# Patient Record
Sex: FEMALE | Race: WHITE | URBAN - METROPOLITAN AREA
[De-identification: names, ages, dates, MRNs, and addresses within clinical notes are randomized per-mention and may not be internally consistent; named-entity substitution may affect disease eponyms.]

---

## 2021-02-10 ENCOUNTER — NEW PATIENT (OUTPATIENT)
Dept: URBAN - METROPOLITAN AREA CLINIC 10 | Facility: CLINIC | Age: 36
End: 2021-02-10
Payer: COMMERCIAL

## 2021-02-10 DIAGNOSIS — H18.623 KERATOCONUS, UNSTABLE, BILATERAL: Primary | ICD-10-CM

## 2021-02-10 PROCEDURE — 99204 OFFICE O/P NEW MOD 45 MIN: CPT | Performed by: OPHTHALMOLOGY

## 2021-02-10 PROCEDURE — 92025 CPTRIZED CORNEAL TOPOGRAPHY: CPT | Performed by: OPHTHALMOLOGY

## 2021-02-10 PROCEDURE — 76514 ECHO EXAM OF EYE THICKNESS: CPT | Performed by: OPHTHALMOLOGY

## 2021-02-10 RX ORDER — OFLOXACIN 3 MG/ML
0.3 % SOLUTION/ DROPS OPHTHALMIC
Qty: 5 | Refills: 2 | Status: INACTIVE
Start: 2021-02-10 | End: 2021-04-13

## 2021-02-10 RX ORDER — PREDNISOLONE ACETATE 10 MG/ML
1 % SUSPENSION/ DROPS OPHTHALMIC
Qty: 5 | Refills: 2 | Status: INACTIVE
Start: 2021-02-10 | End: 2021-04-13

## 2021-02-10 ASSESSMENT — KERATOMETRY: OD: 47.13

## 2021-02-10 ASSESSMENT — VISUAL ACUITY
OD: 20/80
OS: 20/500

## 2021-02-10 ASSESSMENT — INTRAOCULAR PRESSURE
OS: 14
OD: 9

## 2021-05-05 ENCOUNTER — PROCEDURE (OUTPATIENT)
Dept: URBAN - METROPOLITAN AREA CLINIC 10 | Facility: CLINIC | Age: 36
End: 2021-05-05
Payer: COMMERCIAL

## 2021-05-05 PROCEDURE — 0402T COLGN CRS-LINK CRN&PACHYMTRY: CPT | Performed by: OPHTHALMOLOGY

## 2021-05-06 ENCOUNTER — OFFICE VISIT (OUTPATIENT)
Dept: URBAN - METROPOLITAN AREA CLINIC 10 | Facility: CLINIC | Age: 36
End: 2021-05-06
Payer: COMMERCIAL

## 2021-05-06 PROCEDURE — 92071 CONTACT LENS FITTING FOR TX: CPT | Performed by: OPTOMETRIST

## 2021-05-06 PROCEDURE — 99203 OFFICE O/P NEW LOW 30 MIN: CPT | Performed by: OPTOMETRIST

## 2021-05-06 NOTE — IMPRESSION/PLAN
Impression: Keratoconus, unstable, bilateral
- Pt states progression/fluctuation in refraction noted OU by referring optometrist; pt states that scleral CTLs have been changing every year OD, unable to be fitted into CTL OS. Plan: s/p CXL OD yesterday c Dr. Marge Navarrete. Pt. having pain and minimal BSCL movement. Replaced BSCL with 8.8 VIKRAM. Improved movement so hope for increased comfort. Cont. oflox and pred qid OD. RTC as scheduled c Dr. Marge Navarrete next week.

## 2021-05-13 ENCOUNTER — OFFICE VISIT (OUTPATIENT)
Dept: URBAN - METROPOLITAN AREA CLINIC 10 | Facility: CLINIC | Age: 36
End: 2021-05-13
Payer: COMMERCIAL

## 2021-05-13 PROCEDURE — 99213 OFFICE O/P EST LOW 20 MIN: CPT | Performed by: OPTOMETRIST

## 2021-05-13 NOTE — IMPRESSION/PLAN
Impression: Keratoconus, unstable, bilateral
- Pt states progression/fluctuation in refraction noted OU by referring optometrist; pt states that scleral CTLs have been changing every year OD, unable to be fitted into CTL OS. Plan: s/p CXL OD 5/5/2021 c Dr. Erin Ya. BSCL fell out 3 days prior. No issues. Stop oflox. Cont. pred tid and taper weekly. RTC as scheduled c Dr. Erin Ya next month.

## 2021-06-07 ENCOUNTER — OFFICE VISIT (OUTPATIENT)
Dept: URBAN - METROPOLITAN AREA CLINIC 10 | Facility: CLINIC | Age: 36
End: 2021-06-07
Payer: COMMERCIAL

## 2021-06-07 DIAGNOSIS — H18.622 KERATOCONUS, UNSTABLE, LEFT EYE: ICD-10-CM

## 2021-06-07 PROCEDURE — 99213 OFFICE O/P EST LOW 20 MIN: CPT | Performed by: OPHTHALMOLOGY

## 2021-06-07 ASSESSMENT — INTRAOCULAR PRESSURE: OD: 13

## 2021-06-07 NOTE — IMPRESSION/PLAN
Impression: Keratoconus, unstable, left eye: D90.257. Plan: Vision continues to worsen, severe scarring and thinning, not a candidate for CXL. I would recommend PKP surgery. Visual rehabilitation can take a year or longer. Risks of Keratoplasty include similar risks to any intraocular surgery such as bleeding, cataract, and infection. Risks include rejection, need for additional surgery, need for glasses after, and the risks from the medications used. Steroids should not be stopped without instruction by a doctor. Information packet given. Questions answered.   Scheduled PKP OS. will bring pt in for preop before surgery

## 2021-06-07 NOTE — IMPRESSION/PLAN
Impression: Keratoconus, stable, right eye: H18.611.
-s/p CXL OD 5/5/21 Plan: POM#1 OD, residual haze, restart pred BID x 2 weeks then d/c. Cont lubrication. Precautions reviewed. Will recheck pt at preop OS with refract, IOP check, pentacam, pachy OD same day.

## 2021-08-16 ENCOUNTER — ADULT PHYSICAL (OUTPATIENT)
Dept: URBAN - METROPOLITAN AREA CLINIC 10 | Facility: CLINIC | Age: 36
End: 2021-08-16
Payer: COMMERCIAL

## 2021-08-16 DIAGNOSIS — Z01.818 ENCOUNTER FOR OTHER PREPROCEDURAL EXAMINATION: Primary | ICD-10-CM

## 2021-08-16 PROCEDURE — 99202 OFFICE O/P NEW SF 15 MIN: CPT | Performed by: PHYSICIAN ASSISTANT

## 2021-08-16 RX ORDER — PREDNISOLONE ACETATE 10 MG/ML
1 % SUSPENSION/ DROPS OPHTHALMIC
Qty: 5 | Refills: 2 | Status: ACTIVE
Start: 2021-08-16

## 2021-08-16 RX ORDER — OFLOXACIN 3 MG/ML
0.3 % SOLUTION/ DROPS OPHTHALMIC
Qty: 5 | Refills: 2 | Status: INACTIVE
Start: 2021-08-16 | End: 2021-11-22

## 2021-08-23 ENCOUNTER — PRE-OPERATIVE VISIT (OUTPATIENT)
Dept: URBAN - METROPOLITAN AREA CLINIC 44 | Facility: CLINIC | Age: 36
End: 2021-08-23
Payer: COMMERCIAL

## 2021-08-23 DIAGNOSIS — H17.12 CENTRAL CORNEAL OPACITY OF LEFT EYE: Primary | ICD-10-CM

## 2021-08-23 DIAGNOSIS — H18.611 KERATOCONUS, STABLE, RIGHT EYE: ICD-10-CM

## 2021-08-23 PROCEDURE — 99214 OFFICE O/P EST MOD 30 MIN: CPT | Performed by: OPHTHALMOLOGY

## 2021-08-23 ASSESSMENT — INTRAOCULAR PRESSURE: OS: 13

## 2021-08-23 ASSESSMENT — VISUAL ACUITY: OS: 20/300

## 2021-08-23 NOTE — IMPRESSION/PLAN
Impression: Keratoconus, stable, right eye: H18.611.
-s/p CXL OD 5/5/21 Plan: POM#3 OD, doing well with specialty CTL.   CTM

## 2021-08-23 NOTE — IMPRESSION/PLAN
Impression: Central corneal opacity of left eye: H17.12. Plan: Dt severe KCN. I would recommend PKP surgery. Visual rehabilitation can take a year or longer. Risks of Keratoplasty include similar risks to any intraocular surgery such as bleeding, cataract, and infection. Risks include rejection, need for additional surgery, need for glasses after, and the risks from the medications used. Steroids should not be stopped without instruction by a doctor. Information packet given. Questions answered.   Proceed with PKP OS as scheduled

## 2021-08-24 ENCOUNTER — SURGERY (OUTPATIENT)
Dept: URBAN - METROPOLITAN AREA SURGERY 5 | Facility: SURGERY | Age: 36
End: 2021-08-24
Payer: COMMERCIAL

## 2021-08-24 PROCEDURE — 65730 CORNEAL TRANSPLANT: CPT | Performed by: OPHTHALMOLOGY

## 2021-08-24 RX ORDER — ACETAMINOPHEN AND CODEINE PHOSPHATE 300; 30 MG/1; MG/1
TABLET ORAL
Qty: 10 | Refills: 0 | Status: INACTIVE
Start: 2021-08-24 | End: 2021-11-22

## 2021-08-25 ENCOUNTER — POST-OPERATIVE VISIT (OUTPATIENT)
Dept: URBAN - METROPOLITAN AREA CLINIC 10 | Facility: CLINIC | Age: 36
End: 2021-08-25

## 2021-08-25 DIAGNOSIS — Z48.810 ENCOUNTER FOR SURGICAL AFTERCARE FOLLOWING SURGERY ON A SENSE ORGAN: Primary | ICD-10-CM

## 2021-08-25 PROCEDURE — 99024 POSTOP FOLLOW-UP VISIT: CPT | Performed by: OPTOMETRIST

## 2021-08-25 NOTE — IMPRESSION/PLAN
Impression: S/P Penetrating keratoplasty OS - 1 Day. Encounter for surgical aftercare following surgery on a sense organ  Z48.810. Post operative instructions reviewed - Plan: Doing well. PKP in place c no loose sutures. Raghu negative. Begin oflxoacin qid x 1 wk. Begin pred acetate qid x 1 wk then taper weekly. RTC as scheduled c Dr Jaleesa Segovia next week.

## 2021-08-30 ENCOUNTER — OFFICE VISIT (OUTPATIENT)
Dept: URBAN - METROPOLITAN AREA CLINIC 10 | Facility: CLINIC | Age: 36
End: 2021-08-30
Payer: COMMERCIAL

## 2021-08-30 PROCEDURE — 99024 POSTOP FOLLOW-UP VISIT: CPT | Performed by: OPHTHALMOLOGY

## 2021-08-30 ASSESSMENT — INTRAOCULAR PRESSURE: OS: 14

## 2021-08-30 NOTE — IMPRESSION/PLAN
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 - s/p PKP OS 8/24/21 Plan: POW#1, doing well. Edema resolving, sutures intact Cont pred and ATs QID, d/c oflox. Precautions reviewed. RTC 1 month, IOP check.

## 2021-10-01 ENCOUNTER — OFFICE VISIT (OUTPATIENT)
Dept: URBAN - METROPOLITAN AREA CLINIC 10 | Facility: CLINIC | Age: 36
End: 2021-10-01
Payer: COMMERCIAL

## 2021-10-01 PROCEDURE — 99024 POSTOP FOLLOW-UP VISIT: CPT | Performed by: OPHTHALMOLOGY

## 2021-10-01 ASSESSMENT — INTRAOCULAR PRESSURE: OS: 21

## 2021-10-01 NOTE — IMPRESSION/PLAN
Impression: Keratoconus, stable, right eye: H18.611.
-s/p CXL OD 5/5/21 Plan: POM#3 OD, doing well with specialty CTL. CTM Will plan for Pentacam OD nv (Pls have pt take CTL out for pentacam)

## 2021-10-01 NOTE — IMPRESSION/PLAN
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 - s/p PKP OS 8/24/21 Plan: POM#1, doing well. Graft C/C, sutures intact Cont pred QID Cont lubrication Precautions reviewed. RTC 6-8 weeks, IOP check.

## 2021-10-27 ENCOUNTER — OFFICE VISIT (OUTPATIENT)
Dept: URBAN - METROPOLITAN AREA CLINIC 10 | Facility: CLINIC | Age: 36
End: 2021-10-27
Payer: COMMERCIAL

## 2021-10-27 DIAGNOSIS — Z94.7 CORNEAL TRANSPLANT STATUS: Primary | ICD-10-CM

## 2021-10-27 DIAGNOSIS — H40.052 OCULAR HYPERTENSION, LEFT EYE: ICD-10-CM

## 2021-10-27 PROCEDURE — 99213 OFFICE O/P EST LOW 20 MIN: CPT | Performed by: OPTOMETRIST

## 2021-10-27 PROCEDURE — 92133 CPTRZD OPH DX IMG PST SGM ON: CPT | Performed by: OPTOMETRIST

## 2021-10-27 ASSESSMENT — INTRAOCULAR PRESSURE
OD: 13
OS: 17

## 2021-10-27 NOTE — IMPRESSION/PLAN
Impression: Ocular hypertension, left eye: H40.052. Plan: IOP was elevated last visit but normal today. NFL OcT is normal.
Cont. to monitor. s/p PKP OS and on pred.

## 2021-10-27 NOTE — IMPRESSION/PLAN
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 - s/p PKP OS 8/24/21 Plan: POM#2, doing well. Graft C/C, sutures intact Cont pred QID Cont lubrication Precautions reviewed. IOP has normalized. NFL OCT is normal OU. RTC as scheduled c Dr. Oj De Leon.

## 2021-12-14 ENCOUNTER — OFFICE VISIT (OUTPATIENT)
Dept: URBAN - METROPOLITAN AREA CLINIC 10 | Facility: CLINIC | Age: 36
End: 2021-12-14
Payer: COMMERCIAL

## 2021-12-14 DIAGNOSIS — H00.015 HORDEOLUM EXTERNUM LEFT LOWER EYELID: ICD-10-CM

## 2021-12-14 PROCEDURE — 92025 CPTRIZED CORNEAL TOPOGRAPHY: CPT | Performed by: OPHTHALMOLOGY

## 2021-12-14 PROCEDURE — 99213 OFFICE O/P EST LOW 20 MIN: CPT | Performed by: OPHTHALMOLOGY

## 2021-12-14 ASSESSMENT — INTRAOCULAR PRESSURE
OD: 10
OS: 14

## 2021-12-14 NOTE — IMPRESSION/PLAN
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 - s/p PKP OS 8/24/21 Plan: POM#3.5, doing well. Graft C/C, sutures intact Cont pred TID until 12/24 then decrease to BID until 2/24 then decrease to qd, do not stop. Cont lubrication Precautions reviewed. RTC 10-12 weeks, IOP check.

## 2021-12-14 NOTE — IMPRESSION/PLAN
Impression: Keratoconus, stable, right eye: H18.611.
-s/p CXL OD 5/5/21 Plan: Doing well, no progression noted on pentacam today.   CTM

## 2021-12-14 NOTE — IMPRESSION/PLAN
Impression: Hordeolum externum left lower eyelid: H00.015. Plan: Start WC/LS BID. To call and be seen if worse

## 2022-03-31 ENCOUNTER — OFFICE VISIT (OUTPATIENT)
Dept: URBAN - METROPOLITAN AREA CLINIC 10 | Facility: CLINIC | Age: 37
End: 2022-03-31
Payer: COMMERCIAL

## 2022-03-31 PROCEDURE — 99213 OFFICE O/P EST LOW 20 MIN: CPT | Performed by: OPHTHALMOLOGY

## 2022-03-31 PROCEDURE — 92025 CPTRIZED CORNEAL TOPOGRAPHY: CPT | Performed by: OPHTHALMOLOGY

## 2022-03-31 ASSESSMENT — INTRAOCULAR PRESSURE: OS: 18

## 2022-03-31 NOTE — IMPRESSION/PLAN
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 - s/p PKP OS 8/24/21 Plan: POM#7, doing well. Graft C/C, sutures intact. 2 sutures removed at 8 and 2:00, oflox placed. cont oflox TID x 3 days Cont pred qd, do not stop. Cont lubrication Precautions reviewed. 
RTC 10-12 weeks, IOP check, dru, suture removal, cell count, pachy

## 2022-06-14 ENCOUNTER — OFFICE VISIT (OUTPATIENT)
Dept: URBAN - METROPOLITAN AREA CLINIC 10 | Facility: CLINIC | Age: 37
End: 2022-06-14
Payer: COMMERCIAL

## 2022-06-14 DIAGNOSIS — H40.052 OCULAR HYPERTENSION, LEFT EYE: ICD-10-CM

## 2022-06-14 DIAGNOSIS — Z94.7 CORNEAL TRANSPLANT STATUS: ICD-10-CM

## 2022-06-14 DIAGNOSIS — H18.611 KERATOCONUS, STABLE, RIGHT EYE: Primary | ICD-10-CM

## 2022-06-14 PROCEDURE — 99213 OFFICE O/P EST LOW 20 MIN: CPT | Performed by: OPHTHALMOLOGY

## 2022-06-14 PROCEDURE — 76514 ECHO EXAM OF EYE THICKNESS: CPT | Performed by: OPHTHALMOLOGY

## 2022-06-14 PROCEDURE — 92025 CPTRIZED CORNEAL TOPOGRAPHY: CPT | Performed by: OPHTHALMOLOGY

## 2022-06-14 RX ORDER — PREDNISOLONE ACETATE 10 MG/ML
1 % SUSPENSION/ DROPS OPHTHALMIC
Qty: 5 | Refills: 2 | Status: ACTIVE
Start: 2022-06-14

## 2022-06-14 ASSESSMENT — INTRAOCULAR PRESSURE: OS: 16

## 2022-06-14 NOTE — IMPRESSION/PLAN
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 - s/p PKP OS 8/24/21 Plan: POM#10, doing well. Graft C/C, sutures intact. 2 sutures removed, oflox placed. cont oflox TID x 3 days Cont pred qd, do not stop. Cont lubrication Precautions reviewed. Will transfer care to cornea specialist in [place], pt is relocating, will call for records.